# Patient Record
Sex: MALE | Race: WHITE | NOT HISPANIC OR LATINO | URBAN - METROPOLITAN AREA
[De-identification: names, ages, dates, MRNs, and addresses within clinical notes are randomized per-mention and may not be internally consistent; named-entity substitution may affect disease eponyms.]

---

## 2021-02-12 ENCOUNTER — VIRTUAL VISIT (OUTPATIENT)
Dept: FAMILY MEDICINE | Facility: CLINIC | Age: 31
End: 2021-02-12
Payer: COMMERCIAL

## 2021-02-12 DIAGNOSIS — F64.9 GENDER DYSPHORIA: Primary | ICD-10-CM

## 2021-02-12 PROBLEM — A69.20 LYME DISEASE: Status: ACTIVE | Noted: 2021-02-12

## 2021-02-12 PROCEDURE — 99204 OFFICE O/P NEW MOD 45 MIN: CPT | Mod: 95 | Performed by: STUDENT IN AN ORGANIZED HEALTH CARE EDUCATION/TRAINING PROGRAM

## 2021-02-12 RX ORDER — ALPRAZOLAM 0.5 MG
TABLET ORAL
COMMUNITY
Start: 2014-05-01 | End: 2021-02-12

## 2021-02-12 NOTE — PATIENT INSTRUCTIONS
"[email or web address]  [email or web address]/watch?[email or web address]    Informed Consent   Feminizing Medications      This form refers to the use of estrogen and/or androgen antagonists (sometimes called \"anti-androgens\" or \"androgen blockers\") by persons in the male-to-female spectrum who wish to become feminized to reduce gender dysphoria and facilitate a more feminine gender presentation. While there are risks associated with taking feminizing medications, when appropriately prescribed they can greatly improve mental health and quality of life.     Please seek another opinion if you want additional perspective on any aspect of your care.       Feminizing Effects     1. I understand that estrogen, androgen antagonists, or a combination of the two may be  prescribed to reduce male physical features and feminize my body.     2. I understand that the feminizing effects of estrogen and androgen antagonists can take several  months to a year to become noticeable, speed and degree of change is unpredictable.     3.  I understand that if I am taking estrogen I will probably develop breasts, and:        ? Breasts may take several years to develop to their full size.   ? Even if estrogen is stopped, the breast tissue that has developed will remain.   ? As soon as breasts start growing, it is recommended to have an annual breast exam.  ? There may be milky nipple discharge (galactorrhea). If you develop this, it is advised to check with a doctor to determine the cause.   ? It is not known if taking estrogen increases the risk of breast cancer.     4. I understand that the following changes are generally not permanent (that is, they will likely reverse if I stop taking feminizing medications):     ? Skin may become softer.   ? Muscle mass decreases and there may be a decrease in upper body strength.   ? Body hair growth may become less noticeable and grow more slowly,but won't stop.  ? Male " "pattern baldness may slow down, but will probably not stop completely, and hair that has already been lost will likely not grow back.   ? Fat may redistribute to a more feminine pattern (decreased in abdomen, increased on buttocks/hips/thighs - changing from \"apple shape\" to \"pear shape\").       5. I understand that taking feminizing medications will make my testicles produce less testosterone, which can affect my overall sexual function:    ? Fertility may be impaired, and I should consider sperm banking if I desire biological children.  ? Sperm may not mature, leading to reduced fertility. It is still possible to get someone pregnant however and contraception should be used if needed.  ? Testicles may shrink by 25-50%. Testicular examinations are still recommended.  ? The amount of fluid ejaculated may be reduced.   ? There is typically decrease in morning and spontaneous erections.   ? Erections may not be firm enough for penetrative sex.   ? Libido (sex drive) may decrease.     6. I understand that there are some aspects of my body that are not significantly changed by feminizing medications, though there may be other treatments that can be helpful.    ? Garcia/facial hair may grow more slowly and be less noticeable, but will not go away.   ? Voice pitch will not rise and speech patterns will not become more feminine.   ? The laryngeal prominence (\"Khoi's apple\") will not shrink.      Risks of Feminizing Medications     7. I understand that the medical effects and safety of feminizing medications are not fully understood, and that there may be long-term risks that are not yet known.     8. I understand that I am strongly advised not to take more medication than I am prescribed, as this increases health risks. It won't help changes come faster.     9. I understand that feminizing medications can damage the liver. I have been advised that I should be monitored for possible liver damage as long as I am taking " feminizing medications.     10. I understand that feminizing medications will result in changes that will be noticeable by other people, and that some people in similar circumstances have experienced harassment, discrimination, and violence, while others have lost support of loved ones. I have been advised that referrals can be made for support/counselling if this would be helpful.     Medical Risks Associated with Estrogen     11. I understand that taking estrogen increases the risk of blood clots, which can result in:     ? Pulmonary embolism (blood clot to the lungs), which may cause death.  Stroke, which may cause permanent brain damage or death   ? Heart attack   ? Chronic leg vein problems     I understand that the risk of blood clots is much worse if I smoke cigarettes, especially over age 40. I understand that the danger is so high that I should stop smoking completely if I start taking estrogen. I can ask my doctor for advice on quitting.    12. I understand that taking estrogen can increase deposits of fat around my internal organs, which is associated with increased risk for diabetes and heart disease.     13. I understand that taking estrogen can cause increased blood pressure,  estrogen increases the risk of gallstones,  estrogen can cause headaches or migraines and can cause nausea and vomiting. I have been advised that if I develop these, it is recommended that I discuss this with my doctor.     14. I understand that it is not known if taking estrogen increases the risk of non-cancerous tumors of the pituitary gland. I have been informed that although this is typically not life-threatening, it can damage vision. I understand that this will be monitored.    15. I have been informed that I am more likely to have dangerous side effects from estrogen if I smoke, am overweight, am over 40 years old, or have a history of blood clots, high blood pressure, or a family history of breast cancer.     16. I have  been informed that if I take too much estrogen, my body may convert it into testosterone, which may slow or stop feminization.     Medical Risks Associated with Androgen Antagonists     17. I have been informed that spironolactone affects the balance of water and salts in the kidneys, and that this may:     ? Increase the amount of urine produced, and I may urinate more frequently   ? Reduce blood pressure   ? Rarely, cause high levels of potassium in the blood, and this will be monitored    18. I understand that some androgen antagonists make it more difficult to evaluate the results of PSA test. If I am over 50, I should have my prostate evaluated every year.     Prevention of Medical Complications     19. I agree to take feminizing medications as prescribed and to tell my care provider if I am not happy with the treatment or am experiencing any problems.     20. I understand that the right dose or type of medication prescribed for me may not be the same as for someone else.     21. I understand that physical examinations and blood tests are needed on a regular basis (monthly, at first) to check for negative side effects of feminizing medications.     22. I understand that feminization medications can interact with other medication (including other sources of hormones), dietary supplements, herbs, alcohol, and street drugs. I understand that being honest with my care provider about what else I am taking will help prevent medical complications that could be life-threatening. I have been informed that I will continue to get medical care no matter what information I share.     23. I understand that some medical conditions make it dangerous to take estrogen or androgen antagonists. I agree to be checked for risky conditions before the decision to start or continue feminizing medication is made.     24. I understand that I can choose to stop taking feminizing medication at any time, and that it is advised that I do  this with the help of my doctor to make sure there are no negative reactions to stopping. I understand that my doctor may suggest I reduce, switch or stop taking feminizing medication, if there are severe health risks that can't be controlled.    My signature below confirms that:     ? My doctor has talked with me about the benefits and risks of feminizing medication, the possible or likely consequences of hormone therapy, and potential treatment options.   ? I understand the risks that may be involved.   ? I understand that this form covers known effects and risks and that there may be long-term effects or risks that are not yet known.   ? I have had sufficient opportunity to discuss treatment options with my doctor. All of my questions have been answered to my satisfaction.   ? I believe I have adequate knowledge on which to base informed consent to the provision of feminizing medication.     Based on this:     _____ I wish to begin taking estrogen.     _____ I wish to begin taking androgen antagonists (e.g., Spironolactone).     _____ I do not wish to begin taking feminizing medication at this time.     Whatever your current decision is, please talk with your doctor any time you have questions, concerns, or want to re-evaluate your options.         ____________________________________ __________________   Patient Signature     Date         ____________________________________ __________________   Prescribing Clinician Signature    Date

## 2021-02-12 NOTE — PROGRESS NOTES
"       HPI     Start: 8:07 AM  End: 8:40 AM    Patient presents with:  Establish Care: Initial HRT visit     Lives in [place]. No car.    D is a 30 year old individual that uses pronouns she/her and is consulting due to HRT start.    Gender identity  Gender Identity or expression  Female, she/her    Sex Assigned at Birth  male    Sexual orientation   Pansexual.    Gender journey: Has been putting figuring out gender for many years. 7 yo felt gender discordance. For the last 9 years she has told everyone she knows, friends and family. Slightly more comfortable over time, and now it's time to start HRT to help things that are frustrating. In past has had \"breaking point\" having to live double life. Panic attacks, and miserable. This is around college, trying to finish college. Since then, pretty comfortable. Around age 21, presenting as her gender.     Has researched HRT. No specific worries about the medications.     Support network for gender identity: friends and family. Can talk freely to many people. Open to everyone about it including work place.    Anatomic dysphoria  Hair being dark, having to shave all the time.   Fat re-distribution. Thinner hair. Breasts.   Unsure about surgery.    Long time ago has spoken to 2 different doctors about gender - written off, was not helpful, and didn't listen. They tried to treat her depression instead of root of depression which was gender discordance.    Had a mental health provider that was \"given\" to her by doctor, but they weren't very knowledgeable. Otherwise no therapist. Was thinking of starting.    No genital dysphoria. More worried about external things others see.   Voice - kind of bothers her, but has changed it and worked on it over time.    Body characteristics pt is happy with and does not want to change:  Don't know.       Previous medical care related to gender affirmation:   Prior providers None  What hormones have you been on and for how long? N/A  - Any " "problems with prior hormone treatment?   N/A  Previous surgeries related to gender affirmation include: N/A    Procedures or Surgical interventions desired:   Undecided     Mental Health Assessment:  Active symptoms:     Anxiety on and off - used to be bad (panic attack every other day, hands sweating, tunnel vision) - now better. \"Hypochondriac.\" Now figured out what the symptoms are, and recognize for what it is.    Some depressive symptoms - worse prior given periods of stress, grief. Recently been \"fine\" and sometimes don't sleep the best. Overall feel \"alright\"    No thoughts of self harm   Psych dx history None  Psych hospitalizations None  Hx of self harm:  No  Hx of suicidal thoughts: Yes; please explain: younger years per above  Hx of suicide attempts: No    Therapy history for gender support: None  Non gender related therapist? None  Chemical use history None  * Interested in starting - more specific to gender    Medications prescribed for above and by whom? None  External Records received: Yes    Past History   Past Surgical History:   Procedure Laterality Date     HC TOOTH EXTRACTION W/FORCEP       Patient Active Problem List   Diagnosis     Mild intermittent asthma     CARDIOVASCULAR SCREENING; LDL GOAL LESS THAN 160     Lyme disease     Past Medical History:   Diagnosis Date     Inguinal hernia      Mild intermittent asthma        No current outpatient medications on file.     Family History   Problem Relation Age of Onset     C.A.D. Father      Diabetes Father      Hypertension Father    No early MI, stroke. VTE.  Grandfather had multiple heart problems - \"leaky valves.\" No early ASCVD.  Some DM.    No Known Allergies  Social History     Socioeconomic History     Marital status: Single     Spouse name: Not on file     Number of children: Not on file     Years of education: Not on file     Highest education level: Not on file   Occupational History     Occupation: student     Employer: [employer]   Social " Needs     Financial resource strain: Not on file     Food insecurity     Worry: Not on file     Inability: Not on file     Transportation needs     Medical: Not on file     Non-medical: Not on file   Tobacco Use     Smoking status: Never Smoker   Substance and Sexual Activity     Alcohol use: No     Drug use: Not on file     Sexual activity: Never   Lifestyle     Physical activity     Days per week: Not on file     Minutes per session: Not on file     Stress: Not on file   Relationships     Social connections     Talks on phone: Not on file     Gets together: Not on file     Attends Cheondoism service: Not on file     Active member of club or organization: Not on file     Attends meetings of clubs or organizations: Not on file     Relationship status: Not on file     Intimate partner violence     Fear of current or ex partner: Not on file     Emotionally abused: Not on file     Physically abused: Not on file     Forced sexual activity: Not on file   Other Topics Concern     Not on file   Social History Narrative     Not on file       Sexual health and relationships:  Fertility plans:    None  No hx STIs.           Review of Systems:        CONSTITUTIONAL: NEGATIVE for fever, chills, change in weight  INTEGUMENTARY/SKIN: NEGATIVE for worrisome rashes, moles or lesions  EYES: NEGATIVE for vision changes or irritation  ENT/MOUTH: NEGATIVE for ear, mouth and throat problems  RESP: NEGATIVE for significant cough or SOB  BREAST: NEGATIVE for masses, tenderness or discharge  CV: NEGATIVE for chest pain, palpitations or peripheral edema  GI: NEGATIVE for nausea, abdominal pain, heartburn, or change in bowel habits  : NEGATIVE for frequency, dysuria, or hematuria  MUSCULOSKELETAL: some sore muscles - TMJ disorder --> upper body, neck, back  NEURO: NEGATIVE for weakness, dizziness or paresthesias  ENDOCRINE: NEGATIVE for temperature intolerance, skin/hair changes  HEME/ALLERGY: NEGATIVE for bleeding problems  PSYCHIATRIC:  NEGATIVE for changes in mood or affect  Sleep: normal           Physical Exam:   No VS given virtual visit    GENERAL:: healthy, alert and no distress  SKIN: no suspicious lesions, no rashes  Psych: Alert and oriented times 3; coherent speech, normal rate and volume, able to articulate logical thoughts, able to abstract reason, no tangential thoughts, no hallucinations or delusions. Affect is euthymic.         Labs:     Office Visit on 12/27/2007   Component Date Value Ref Range Status     Specimen Description 12/27/2007 Throat   Final     Rapid Strep A Screen 12/27/2007    Final                    Value:NEGATIVE: No Group A streptococcal antigen detected by immunoassay, await                           culture report.     Report status 12/27/2007 FINAL 12/27/2007   Final     Specimen Description 12/27/2007 Throat   Final     Culture Micro 12/27/2007 No Beta Streptococcus isolated   Final     Report status 12/27/2007 FINAL 12/31/2007   Final     Assessment and Plan     Carmelo was seen today for establish care.    Diagnoses and all orders for this visit:    Gender dysphoria  Establishing care today for gender-affirming HRT. Extensive history obtained.  [facility] referral placed for therapy (gender-specific), voice training, hair removal options  Plan:    Next visit to discuss informed consent form. Placed in AVS for patient to review    I will review medical records in more detail    Plan for labs to be done - can be at any [facility] lab close to patient    Will need to discuss in more detail administration method  -     COMPREHENSIVE GENDER CARE REFERRAL - INTERNAL      Educated about 3 parts of evaluation:    1. Medical safety for hormones :   Labs done today, see above   NEIL for records sent, will need to be reviewed by: myself  Medication plan:   feminizing hormone therapy with estrogen     Administration method:  TBD  Next labs and exam:       2. Mental health assessment  To be completed by: myself    3. Informed consent  process.   Consent  Yes Is able to provide informed consent   Yes Likely to take hormones in a responsible manner  No Discussed physical effects, benefits, and risk assessment & modification  No Discussed the clinical and biochemical monitoring of hormonal changes and the potential impact on reproductive health & fertility        Today's visit included assessment of interventions to alleviate symptoms related to gender dysphoria or gender nonconformity, including psychological support, medical treatment, and options for social support or changes in gender expression. Today's visit also assessed this individual's suicide risk, as transgender patients are at higher risk of suicide, gender expression, gender identity and how well consolidated in that identity, presence or absence of gender dysphoria versus gender non-conformity, and assessment and diagnosis of coexisting mental health concerns.This assessment is based on the 2011 published Standards of Care for the Health of Transsexual, Transgender, and Gender-Nonconforming People, Version 7, by the [employer] of Transgender Health.  Mera Montana MD

## 2021-02-12 NOTE — PROGRESS NOTES
Preceptor Attestation:   Patient seen and evaluated via video visit. I discussed the patient with the resident. I have verified the content of the note, which accurately reflects my assessment of the patient and the plan of care.   Supervising Physician:  Anton Machado DO.

## 2021-02-13 ENCOUNTER — HEALTH MAINTENANCE LETTER (OUTPATIENT)
Age: 31
End: 2021-02-13

## 2021-02-16 ENCOUNTER — TELEPHONE (OUTPATIENT)
Dept: OTOLARYNGOLOGY | Facility: CLINIC | Age: 31
End: 2021-02-16

## 2021-02-16 NOTE — PROGRESS NOTES
Kaity is a 30 year old who is being evaluated via a billable video visit.      How would you like to obtain your AVS? [employer]  If the video visit is dropped, the invitation should be resent by: Send to e-mail at: [email or web address]  Will anyone else be joining your video visit? No      Video Start Time: 8:04 AM    Assessment & Plan     Gender dysphoria  Consent form reviewed today. Verbal consent given, form signed by me.   She will obtain baseline lab work. After this, I will review labs with patient via [employer] and send rx.  Plan for PO estradiol 2 mg daily, spironolactone 50 mg daily.   F/u in 2-4 weeks after HRT start. Will also follow up on referrals at this visit (comprehensive gender care).  - CBC with platelets  - Comprehensive metabolic panel  - Lipid panel  - Testosterone total          Return in about 1 month (around 3/17/2021).    Mera Montana MD  [facility]    Russ Briceno is a 30 year old who presents for the following health issues     HPI   Patient presents with:  RECHECK: HRT follow up. no concerns or qusetions per patient.    DEANDRE-7 SCORE 2/17/2021   Total Score 8 (mild anxiety)   Total Score 8     PHQ 2/17/2021   PHQ-9 Total Score 4   Q9: Thoughts of better off dead/self-harm past 2 weeks Not at all       Reviewed consent form today  Had questions about SL estradiol      Review of Systems   Constitutional, HEENT, cardiovascular, pulmonary, gi and gu systems are negative, except as otherwise noted.      Objective           Vitals:  No vitals were obtained today due to virtual visit.    Physical Exam   GENERAL: Healthy, alert and no distress  EYES: Eyes grossly normal to inspection.  No discharge or erythema, or obvious scleral/conjunctival abnormalities.  RESP: No audible wheeze, cough, or visible cyanosis.  No visible retractions or increased work of breathing.    SKIN: Visible skin clear. No significant rash, abnormal pigmentation or lesions.  NEURO: Cranial nerves  grossly intact.  Mentation and speech appropriate for age.  PSYCH: Mentation appears normal, affect normal/bright, judgement and insight intact, normal speech and appearance well-groomed.        ----- Service Performed and Documented by Resident or Fellow ------        Video-Visit Details    Type of service:  Video Visit    Video End Time:8:29 AM    Originating Location (pt. Location): Home    Distant [facility]     Platform used for Video Visit: [employer]    Answers for HPI/ROS submitted by the patient on 2/17/2021   If you checked off any problems, how difficult have these problems made it for you to do your work, take care of things at home, or get along with other people?: Somewhat difficult  PHQ9 TOTAL SCORE: 4  DEANDRE 7 TOTAL SCORE: 8

## 2021-02-16 NOTE — TELEPHONE ENCOUNTER
LVM offering next available New Video/Telephone visit with Mary Trimble or Dakotah Smith per Comprehensive Gender Care Referral for Vocal Training.     Pt can also be scheduled for two Return Video/Telephone visits after New visit (all appts should be at least 14 days apart).     Please attach referral to appt.     Left call center number for scheduling.    Writer also explained that [place] so it is recommended that pt check with their insurance provider on coverage. Also provided [facility] contact info ([phone]).

## 2021-02-17 ENCOUNTER — VIRTUAL VISIT (OUTPATIENT)
Dept: FAMILY MEDICINE | Facility: CLINIC | Age: 31
End: 2021-02-17
Payer: COMMERCIAL

## 2021-02-17 DIAGNOSIS — F64.9 GENDER DYSPHORIA: Primary | ICD-10-CM

## 2021-02-17 PROCEDURE — 99214 OFFICE O/P EST MOD 30 MIN: CPT | Mod: 95 | Performed by: STUDENT IN AN ORGANIZED HEALTH CARE EDUCATION/TRAINING PROGRAM

## 2021-02-17 ASSESSMENT — ANXIETY QUESTIONNAIRES
7. FEELING AFRAID AS IF SOMETHING AWFUL MIGHT HAPPEN: SEVERAL DAYS
3. WORRYING TOO MUCH ABOUT DIFFERENT THINGS: SEVERAL DAYS
GAD7 TOTAL SCORE: 8
2. NOT BEING ABLE TO STOP OR CONTROL WORRYING: SEVERAL DAYS
1. FEELING NERVOUS, ANXIOUS, OR ON EDGE: MORE THAN HALF THE DAYS
GAD7 TOTAL SCORE: 8
5. BEING SO RESTLESS THAT IT IS HARD TO SIT STILL: SEVERAL DAYS
7. FEELING AFRAID AS IF SOMETHING AWFUL MIGHT HAPPEN: SEVERAL DAYS
4. TROUBLE RELAXING: SEVERAL DAYS
GAD7 TOTAL SCORE: 8
6. BECOMING EASILY ANNOYED OR IRRITABLE: SEVERAL DAYS

## 2021-02-17 ASSESSMENT — PATIENT HEALTH QUESTIONNAIRE - PHQ9
10. IF YOU CHECKED OFF ANY PROBLEMS, HOW DIFFICULT HAVE THESE PROBLEMS MADE IT FOR YOU TO DO YOUR WORK, TAKE CARE OF THINGS AT HOME, OR GET ALONG WITH OTHER PEOPLE: SOMEWHAT DIFFICULT
SUM OF ALL RESPONSES TO PHQ QUESTIONS 1-9: 4
SUM OF ALL RESPONSES TO PHQ QUESTIONS 1-9: 4

## 2021-02-17 NOTE — PATIENT INSTRUCTIONS
1) Get labs done at any [place] lab  2) After this, I will MyChart you to discuss the results, and send in your prescription  3) We should plan to follow up in 2-4 weeks after you start the medications        Effects and Expected Time Course of Feminizing Hormones    Effect Expected Onset Expected Maximum Effect   Decreased Libido 1-3 months 1-2 years   Decreased Spontaneous Erections 1-3 months 3-6 months   Male pattern baldness No regrowth, loss stops 1-3 months 1-2 years   Softening of skin/decreased oiliness 3-6 months Unknown   Decreased Muscle mass 3-6 months 1-2 years   Breast Growth 3-6 months 2-3 years   Decreased Testicular volume 3-6 months 2-3 years   Body Fat redistribution 3-6 months 2-5 years   Thinning and slowed growth of body and facial hair  + 6-12 months >3 years   Male Sexual Dysfunction Variable Variable   Decreased sperm production Variable Variable   +complete removal of male facial hair and body hair requires electrolysis, laser treatment or both

## 2021-02-17 NOTE — PROGRESS NOTES
Preceptor Attestation:   Patient seen via video, evaluated and discussed with the resident. I have verified the content of the note, which accurately reflects my assessment of the patient and the plan of care.   Supervising Physician:  William Darby MD   Answers for HPI/ROS submitted by the patient on 2/17/2021   If you checked off any problems, how difficult have these problems made it for you to do your work, take care of things at home, or get along with other people?: Somewhat difficult  PHQ9 TOTAL SCORE: 4  DEANDRE 7 TOTAL SCORE: 8

## 2021-02-18 ASSESSMENT — PATIENT HEALTH QUESTIONNAIRE - PHQ9: SUM OF ALL RESPONSES TO PHQ QUESTIONS 1-9: 4

## 2021-02-18 ASSESSMENT — ANXIETY QUESTIONNAIRES: GAD7 TOTAL SCORE: 8

## 2021-03-05 DIAGNOSIS — F64.9 GENDER DYSPHORIA: ICD-10-CM

## 2021-03-05 LAB
ALBUMIN SERPL-MCNC: 4.1 G/DL (ref 3.4–5)
ALP SERPL-CCNC: 46 U/L (ref 40–150)
ALT SERPL W P-5'-P-CCNC: 27 U/L (ref 0–70)
ANION GAP SERPL CALCULATED.3IONS-SCNC: 6 MMOL/L (ref 3–14)
AST SERPL W P-5'-P-CCNC: 20 U/L (ref 0–45)
BILIRUB SERPL-MCNC: 0.2 MG/DL (ref 0.2–1.3)
BUN SERPL-MCNC: 9 MG/DL (ref 7–30)
CALCIUM SERPL-MCNC: 9.6 MG/DL (ref 8.5–10.1)
CHLORIDE SERPL-SCNC: 104 MMOL/L (ref 94–109)
CHOLEST SERPL-MCNC: 164 MG/DL
CO2 SERPL-SCNC: 29 MMOL/L (ref 20–32)
CREAT SERPL-MCNC: 0.84 MG/DL (ref 0.66–1.25)
ERYTHROCYTE [DISTWIDTH] IN BLOOD BY AUTOMATED COUNT: 12.2 % (ref 10–15)
GFR SERPL CREATININE-BSD FRML MDRD: >90 ML/MIN/{1.73_M2}
GLUCOSE SERPL-MCNC: 109 MG/DL (ref 70–99)
HCT VFR BLD AUTO: 49.9 % (ref 40–53)
HDLC SERPL-MCNC: 30 MG/DL
HGB BLD-MCNC: 15.8 G/DL (ref 13.3–17.7)
LDLC SERPL CALC-MCNC: 94 MG/DL
MCH RBC QN AUTO: 29.6 PG (ref 26.5–33)
MCHC RBC AUTO-ENTMCNC: 31.7 G/DL (ref 31.5–36.5)
MCV RBC AUTO: 94 FL (ref 78–100)
NONHDLC SERPL-MCNC: 134 MG/DL
PLATELET # BLD AUTO: 170 10E9/L (ref 150–450)
POTASSIUM SERPL-SCNC: 4.3 MMOL/L (ref 3.4–5.3)
PROT SERPL-MCNC: 8 G/DL (ref 6.8–8.8)
RBC # BLD AUTO: 5.33 10E12/L (ref 4.4–5.9)
SODIUM SERPL-SCNC: 139 MMOL/L (ref 133–144)
TRIGL SERPL-MCNC: 202 MG/DL
WBC # BLD AUTO: 8.2 10E9/L (ref 4–11)

## 2021-03-05 PROCEDURE — 80061 LIPID PANEL: CPT | Performed by: STUDENT IN AN ORGANIZED HEALTH CARE EDUCATION/TRAINING PROGRAM

## 2021-03-05 PROCEDURE — 85027 COMPLETE CBC AUTOMATED: CPT | Performed by: STUDENT IN AN ORGANIZED HEALTH CARE EDUCATION/TRAINING PROGRAM

## 2021-03-05 PROCEDURE — 80053 COMPREHEN METABOLIC PANEL: CPT | Performed by: STUDENT IN AN ORGANIZED HEALTH CARE EDUCATION/TRAINING PROGRAM

## 2021-03-05 PROCEDURE — 84403 ASSAY OF TOTAL TESTOSTERONE: CPT | Performed by: STUDENT IN AN ORGANIZED HEALTH CARE EDUCATION/TRAINING PROGRAM

## 2021-03-05 PROCEDURE — 36415 COLL VENOUS BLD VENIPUNCTURE: CPT | Performed by: STUDENT IN AN ORGANIZED HEALTH CARE EDUCATION/TRAINING PROGRAM

## 2021-03-08 DIAGNOSIS — F64.9 GENDER DYSPHORIA: Primary | ICD-10-CM

## 2021-03-09 LAB — TESTOST SERPL-MCNC: 339 NG/DL (ref 240–950)

## 2021-03-10 RX ORDER — SPIRONOLACTONE 50 MG/1
50 TABLET, FILM COATED ORAL DAILY
Qty: 30 TABLET | Refills: 0 | Status: SHIPPED | OUTPATIENT
Start: 2021-03-10 | End: 2021-04-22

## 2021-03-10 RX ORDER — ESTRADIOL 2 MG/1
2 TABLET ORAL DAILY
Qty: 30 TABLET | Refills: 0 | Status: SHIPPED | OUTPATIENT
Start: 2021-03-10 | End: 2021-04-22

## 2021-04-21 NOTE — PROGRESS NOTES
"    Assessment & Plan     Gender dysphoria  3/24 HRT start. Early check-in, no concerns. Refilled. Next f/u in 2 months - will obtain 3 month labs at this time.  - spironolactone (ALDACTONE) 50 MG tablet  Dispense: 90 tablet; Refill: 1  - estradiol (ESTRACE) 2 MG tablet  Dispense: 90 tablet; Refill: 1    Grief  Housing issues  Recent death of grandmother who patient was caring for in hospice. Multiple stressors including now having to find a new place to live. Offered SW assistance today, which she deferred. Encouraged her to reach out if support needed      Return in about 2 months (around 6/22/2021).    Mera Montana MD  [facility] ARABELLA Briceno is a 30 year old who presents for the following health issues     HPI     Patient presents with:  RECHECK: HRT f/u. no other specific questions or concerns. pateint wants to check up on medications. no side effects on medicatons.   Refill Request: spironolactone and estradiol   HRT started exactly 30 days ago - out today.  Going well. No headache, chest pain, calf pain, dyspnea, abdominal pain. No noticeable urinary frequency. No light-headedness.    Grandma who she lived with and was taking care of in hospice passed away 2 weeks ago. Lots of stressors in life right now. Family conflict. She also has to find a new place to live now without much support.      Review of Systems   Constitutional, HEENT, cardiovascular, pulmonary, gi and gu systems are negative, except as otherwise noted.      Objective    /81   Pulse 76   Temp 98.6  F (37  C) (Oral)   Resp 16   Ht 1.778 m (5' 10\")   Wt 77.1 kg (170 lb)   SpO2 100%   BMI 24.39 kg/m    Body mass index is 24.39 kg/m .  Physical Exam  Constitutional:       General: She is not in acute distress.     Appearance: She is well-developed. She is not diaphoretic.   HENT:      Head: Normocephalic and atraumatic.   Eyes:      Conjunctiva/sclera: Conjunctivae normal.   Neck:      Musculoskeletal: " Normal range of motion.   Cardiovascular:      Rate and Rhythm: Normal rate.   Pulmonary:      Effort: Pulmonary effort is normal. No respiratory distress.   Musculoskeletal: Normal range of motion.      Comments: No calf tenderness or swelling   Neurological:      General: No focal deficit present.      Mental Status: She is alert.            ----- Service Performed and Documented by Resident or Fellow ------

## 2021-04-22 ENCOUNTER — OFFICE VISIT (OUTPATIENT)
Dept: FAMILY MEDICINE | Facility: CLINIC | Age: 31
End: 2021-04-22
Payer: COMMERCIAL

## 2021-04-22 VITALS
TEMPERATURE: 98.6 F | RESPIRATION RATE: 16 BRPM | HEIGHT: 70 IN | HEART RATE: 76 BPM | OXYGEN SATURATION: 100 % | BODY MASS INDEX: 24.34 KG/M2 | WEIGHT: 170 LBS | DIASTOLIC BLOOD PRESSURE: 81 MMHG | SYSTOLIC BLOOD PRESSURE: 110 MMHG

## 2021-04-22 DIAGNOSIS — Z59.9 HOUSING PROBLEMS: ICD-10-CM

## 2021-04-22 DIAGNOSIS — F64.9 GENDER DYSPHORIA: Primary | ICD-10-CM

## 2021-04-22 DIAGNOSIS — F43.21 GRIEF: ICD-10-CM

## 2021-04-22 PROCEDURE — 99214 OFFICE O/P EST MOD 30 MIN: CPT | Mod: GC | Performed by: STUDENT IN AN ORGANIZED HEALTH CARE EDUCATION/TRAINING PROGRAM

## 2021-04-22 RX ORDER — SPIRONOLACTONE 50 MG/1
50 TABLET, FILM COATED ORAL DAILY
Qty: 90 TABLET | Refills: 1 | Status: SHIPPED | OUTPATIENT
Start: 2021-04-22 | End: 2021-07-15

## 2021-04-22 RX ORDER — ESTRADIOL 2 MG/1
2 TABLET ORAL DAILY
Qty: 90 TABLET | Refills: 1 | Status: SHIPPED | OUTPATIENT
Start: 2021-04-22 | End: 2021-10-25

## 2021-04-22 SDOH — ECONOMIC STABILITY - INCOME SECURITY: PROBLEM RELATED TO HOUSING AND ECONOMIC CIRCUMSTANCES, UNSPECIFIED: Z59.9

## 2021-04-22 ASSESSMENT — MIFFLIN-ST. JEOR: SCORE: 1732.36

## 2021-04-22 NOTE — PROGRESS NOTES
Preceptor Attestation:    I discussed the patient with the resident and evaluated the patient in person. I have verified the content of the note, which accurately reflects my assessment of the patient and the plan of care.   Supervising Physician:  Yovani Joe MD.

## 2021-07-13 ENCOUNTER — TELEPHONE (OUTPATIENT)
Dept: FAMILY MEDICINE | Facility: CLINIC | Age: 31
End: 2021-07-13

## 2021-07-13 DIAGNOSIS — F64.9 GENDER DYSPHORIA: ICD-10-CM

## 2021-07-13 NOTE — TELEPHONE ENCOUNTER
[facility] phone call message- patient requesting a refill:    Full Medication Name: spironolactone (ALDACTONE) 50 MG tablet, estradiol (ESTRACE) 2 MG tablet    Dose: See chart    Pharmacy confirmed as   [facility] - [place], [place] - [address], [place] [address]  Phone: [phone]  : Yes    Additional Comments: Almost out     OK to leave a message on voice mail? Yes    Primary language: English      needed? No    Call taken on July 13, 2021 at 10:28 AM by Derian Kaufman

## 2021-07-15 RX ORDER — SPIRONOLACTONE 50 MG/1
50 TABLET, FILM COATED ORAL DAILY
Qty: 90 TABLET | Refills: 1 | Status: SHIPPED | OUTPATIENT
Start: 2021-07-15 | End: 2021-12-07

## 2021-07-19 NOTE — TELEPHONE ENCOUNTER
RN reached out to pharmacy who confirmed that aletha is ready for pick-up and estradiol is ready for  at different pharmacy ([facility] [address]).     RN attempted to reach patient, invalid number on file. Will reach out via [employer].     Brenda Fulton RN

## 2021-09-14 ENCOUNTER — TELEPHONE (OUTPATIENT)
Dept: DERMATOLOGY | Facility: CLINIC | Age: 31
End: 2021-09-14

## 2021-09-14 NOTE — TELEPHONE ENCOUNTER
Unable to leave message (phone number on file is not taking calls at this time) to call [facility].    Patient was on our wait list to see Dr. Kilpatrick for a laser hair removal consult.  Please offer patient a telephone visit with Dr. Kilpatrick on any MICHELLE slot on Tuesdays.  Patients are required to submit a photo via [employer] of their hair color in the upper pubic region.  They should not send a photo of the genitals, but just the top of the mons pubis so Dr. Kilpatrick can determine natural hair color for laser settings.    Melissa Forrester  Surgical Specialties Procedure   [facility]  9/14/2021 9:33 AM

## 2021-09-19 ENCOUNTER — HEALTH MAINTENANCE LETTER (OUTPATIENT)
Age: 31
End: 2021-09-19

## 2021-09-21 NOTE — TELEPHONE ENCOUNTER
2nd attempt to schedule as noted below.  Soum message sent to patient.    Melissa Forrester  Surgical Specialties Procedure   [facility]  9/21/2021 9:01 AM

## 2021-10-22 DIAGNOSIS — F64.9 GENDER DYSPHORIA: ICD-10-CM

## 2021-10-22 NOTE — TELEPHONE ENCOUNTER
Pt previously saw Dr. Montana who has now graduated for hormone care, new assigned provider would be Dr. Treviño.,    Routing to review refill.    RN attempted to call pt back to schedule appt as they are due per last office note but not accepting calls. If pt calls back please schedule appt in clinic    Chantale Thomas RN

## 2021-10-22 NOTE — TELEPHONE ENCOUNTER
Verify that the refill encounter hasn't been started Yes    [facility] phone call message- patient requesting a refill:    Full Medication Name:   estradiol (ESTRACE) 2 MG tablet 90 tablet 1         Dose: Sig - Route: Take 1 tablet (2 mg) by mouth daily      Pharmacy confirmed as   [id number] - [place], [place] - [address] [facility] [address]  [address]  [facility] [address]  Phone: [phone] Fax: [phone]  : Yes    Medication tab checked to see if medication has been sent  Yes    Additional Comments: Patient completely out, was unaware they needed to request refills.     OK to leave a message on voice mail? Yes    Advised patient refill may take up to 2 business days? Yes    Primary language: English      needed? No    Call taken on October 22, 2021 at 2:51 PM by Tanika Fisher to P [facility] MED REFILL

## 2021-10-25 RX ORDER — ESTRADIOL 2 MG/1
2 TABLET ORAL DAILY
Qty: 30 TABLET | Refills: 0 | Status: SHIPPED | OUTPATIENT
Start: 2021-10-25 | End: 2021-12-07

## 2021-10-26 NOTE — TELEPHONE ENCOUNTER
Estradiol 1 month supply sent. Patient new to me, last seen by prior PCP 4/22/2021, doing well. Recommended follow up in 2 months for 3 month labs at that time. Is on low dose. Absolutely no refills until patient has a follow up appointment with labs.     Yvrose Treviño MD

## 2021-12-06 DIAGNOSIS — F64.9 GENDER DYSPHORIA: ICD-10-CM

## 2021-12-06 NOTE — TELEPHONE ENCOUNTER
Follow up appt scheduled 12/17. RN unable to reorder as previous note states no refills until follow-up appointment with lab. Routing to Dr. Treviño to refill if appropriate.     Brenda Fulton RN

## 2021-12-06 NOTE — TELEPHONE ENCOUNTER
[facility] phone call message- patient requesting a refill:    Full Medication Name: Spironolactone, estradiol        Pharmacy confirmed as :  [id number] - [place], [place] - [address]  [address] [address]  Phone: [phone] Fax: [phone] Yes    Additional Comments: Patient is out of medication     OK to leave a message on voice mail? Yes    Primary language: English      needed? No    Call taken on December 6, 2021 at 3:50 PM by Dyana Chen CMA

## 2021-12-07 RX ORDER — SPIRONOLACTONE 50 MG/1
50 TABLET, FILM COATED ORAL DAILY
Qty: 30 TABLET | Refills: 0 | Status: SHIPPED | OUTPATIENT
Start: 2021-12-07 | End: 2022-05-05

## 2021-12-07 RX ORDER — ESTRADIOL 2 MG/1
2 TABLET ORAL DAILY
Qty: 30 TABLET | Refills: 0 | Status: SHIPPED | OUTPATIENT
Start: 2021-12-07 | End: 2021-12-17

## 2021-12-07 NOTE — TELEPHONE ENCOUNTER
Patient new to me, previously followed by Dr. Montana. Started gender affirming hormone therapy with estradiol and spironolactone in March 2021, has not had labs since that time. Scheduled to see me 12/17. Short refill ordered so labs can be done at upcoming visit. No further refills until visit with labs.    Yvrose Treviño MD

## 2021-12-17 ENCOUNTER — OFFICE VISIT (OUTPATIENT)
Dept: FAMILY MEDICINE | Facility: CLINIC | Age: 31
End: 2021-12-17
Payer: COMMERCIAL

## 2021-12-17 VITALS
SYSTOLIC BLOOD PRESSURE: 134 MMHG | RESPIRATION RATE: 16 BRPM | BODY MASS INDEX: 24.34 KG/M2 | OXYGEN SATURATION: 96 % | TEMPERATURE: 98.5 F | HEART RATE: 97 BPM | HEIGHT: 70 IN | WEIGHT: 170 LBS | DIASTOLIC BLOOD PRESSURE: 84 MMHG

## 2021-12-17 DIAGNOSIS — F64.9 GENDER DYSPHORIA: ICD-10-CM

## 2021-12-17 LAB
ALBUMIN SERPL-MCNC: 4.4 G/DL (ref 3.4–5)
ALP SERPL-CCNC: 40 U/L (ref 40–150)
ALT SERPL W P-5'-P-CCNC: 44 U/L (ref 0–70)
ANION GAP SERPL CALCULATED.3IONS-SCNC: 7 MMOL/L (ref 3–14)
AST SERPL W P-5'-P-CCNC: 25 U/L (ref 0–45)
BILIRUB SERPL-MCNC: 0.5 MG/DL (ref 0.2–1.3)
BUN SERPL-MCNC: 11 MG/DL (ref 7–30)
CALCIUM SERPL-MCNC: 10 MG/DL (ref 8.5–10.1)
CHLORIDE BLD-SCNC: 104 MMOL/L (ref 94–109)
CO2 SERPL-SCNC: 28 MMOL/L (ref 20–32)
CREAT SERPL-MCNC: 0.92 MG/DL (ref 0.52–1.25)
GFR SERPL CREATININE-BSD FRML MDRD: >90 ML/MIN/1.73M2
GLUCOSE BLD-MCNC: 104 MG/DL (ref 70–99)
HBA1C MFR BLD: 5 % (ref 0–5.6)
POTASSIUM BLD-SCNC: 4.2 MMOL/L (ref 3.4–5.3)
PROT SERPL-MCNC: 8.5 G/DL (ref 6.8–8.8)
SODIUM SERPL-SCNC: 139 MMOL/L (ref 133–144)

## 2021-12-17 PROCEDURE — 83036 HEMOGLOBIN GLYCOSYLATED A1C: CPT | Performed by: FAMILY MEDICINE

## 2021-12-17 PROCEDURE — 99214 OFFICE O/P EST MOD 30 MIN: CPT | Performed by: FAMILY MEDICINE

## 2021-12-17 PROCEDURE — 36415 COLL VENOUS BLD VENIPUNCTURE: CPT | Performed by: FAMILY MEDICINE

## 2021-12-17 PROCEDURE — 84403 ASSAY OF TOTAL TESTOSTERONE: CPT | Mod: KX | Performed by: FAMILY MEDICINE

## 2021-12-17 PROCEDURE — 80061 LIPID PANEL: CPT | Performed by: FAMILY MEDICINE

## 2021-12-17 PROCEDURE — 80053 COMPREHEN METABOLIC PANEL: CPT | Performed by: FAMILY MEDICINE

## 2021-12-17 RX ORDER — ALPRAZOLAM 0.5 MG
0.5 TABLET ORAL DAILY PRN
COMMUNITY

## 2021-12-17 RX ORDER — ESTRADIOL 2 MG/1
2 TABLET ORAL DAILY
Qty: 30 TABLET | Refills: 0 | Status: SHIPPED | OUTPATIENT
Start: 2021-12-17 | End: 2022-02-01

## 2021-12-17 ASSESSMENT — MIFFLIN-ST. JEOR: SCORE: 1727.36

## 2021-12-17 NOTE — PROGRESS NOTES
Assessment & Plan     Gender dysphoria  Kaity (she/her) is a 31 year old presenting for delayed 3 month HRT follow-up. Started HRT in March 2021, has been taking 2mg estradiol daily and 50mg spironolactone each once daily. No SOB, chest pain, or leg swelling. She has been seeing positive changes with this dose and would like to continue on 2mg. Refilled estradiol today; pt has aletha at home. Checking labs today, will send results via [employer]. Plan to follow up in 6 months for labs.  - estradiol (ESTRACE) 2 MG tablet  Dispense: 30 tablet; Refill: 0  - Lipid panel  - Testosterone total  - Hemoglobin A1c  - Comprehensive metabolic panel    Anxiety  Discussed mood and how she's been feeling. Anxiety has significantly improved since starting HRT. Has panic attacks every 1-2 months which she manages with 0.5mg Xanax and this works well for her. She has been noticing some mood lability in the past 1-2 months but overall mood has been improving.       Follow up in six months for refill and labs.    No follow-ups on file.     Adalberto Eastman, MS3    Preceptor Attestation:   I was present with the medical student who participated in the service and in the documentation of this note. I have verified the history and personally performed the physical exam and medical decision making. I have verified the content of the note, which accurately reflects my assessment of the patient and the plan of care.      Katiana Gates DO  [facility]    Subjective   Kaity is a 31 year old who presents for the following health issues:    HPI          HPI   Kaity is a 31 year old individual that uses pronouns She/Her/Hers/Herself that presents today for follow up of:  feminizing hormone therapy.     Gender identity: female    Any special concerns today? refill    Has not had estradiol for 3 days because prescription ran out, still taking spironolactone - new pharmacy is [facility]    On hormones?  YES  "+++ Shot day of the week? Not applicable-taking pills/patch/gel   Due for labs?  Yes  Refills of meds needed?  Yes    Past Surgical History:   Procedure Laterality Date     HC TOOTH EXTRACTION W/FORCEP       Patient Active Problem List   Diagnosis     Mild intermittent asthma     CARDIOVASCULAR SCREENING; LDL GOAL LESS THAN 160     Lyme disease     Gender dysphoria     Current Outpatient Medications   Medication Sig Dispense Refill     ALPRAZolam (XANAX) 0.5 MG tablet Take 0.5 mg by mouth daily as needed       estradiol (ESTRACE) 2 MG tablet Take 1 tablet (2 mg) by mouth daily 30 tablet 0     spironolactone (ALDACTONE) 50 MG tablet Take 1 tablet (50 mg) by mouth daily 30 tablet 0     History   Smoking Status     Never Smoker   Smokeless Tobacco     Never Used          Review of Systems:        General    Fat redistribution: yes, stretch marks on legs    Weight change: YES - some weight gain HEENT    Voice change: no     Cardiovascular (CV)    Chest Pains: no    Shortness of breath: related to asthma but not otherwise Chest    Breast changes/development: YES     Gastrointestinal (GI)    Abdominal pain: no    Change in appetite: YES - more hungry Skin    Acne or oily skin: no    Change in hair: no     Genitourinary ()    Change in libido: YES - lower    New sexual partners: YES - female Musculoskeletal    Leg pain or swelling: no     Psychiatric (Psych)    Depression: YES sometimes but anxiety is more pronounced, has also improved with HRT    Anxiety/Panic: yes but has decreased since starting HRT    Mood:  \"good\" and \"fine\"    Labile mood: last month or so              Objective    /84   Pulse 97   Temp 98.5  F (36.9  C) (Oral)   Resp 16   Ht 1.778 m (5' 10\")   Wt 77.1 kg (170 lb)   SpO2 96%   BMI 24.39 kg/m    Body mass index is 24.39 kg/m .  Physical Exam  Vitals reviewed.   Constitutional:       Appearance: Normal appearance.   HENT:      Head: Normocephalic and atraumatic.   Eyes:      General: No " scleral icterus.     Extraocular Movements: Extraocular movements intact.      Conjunctiva/sclera: Conjunctivae normal.   Cardiovascular:      Rate and Rhythm: Normal rate and regular rhythm.      Heart sounds: Normal heart sounds. No murmur heard.  No friction rub. No gallop.    Pulmonary:      Effort: Pulmonary effort is normal. No respiratory distress.      Breath sounds: Normal breath sounds. No stridor. No wheezing or rhonchi.   Musculoskeletal:         General: No swelling.   Neurological:      General: No focal deficit present.      Mental Status: She is alert. Mental status is at baseline.   Psychiatric:         Attention and Perception: Attention normal.         Mood and Affect: Affect normal. Mood is anxious.         Thought Content: Thought content normal.      No results found for this or any previous visit (from the past 24 hour(s)).

## 2021-12-18 LAB
CHOLEST SERPL-MCNC: 175 MG/DL
FASTING STATUS PATIENT QL REPORTED: ABNORMAL
HDLC SERPL-MCNC: 31 MG/DL
LDLC SERPL CALC-MCNC: 117 MG/DL
NONHDLC SERPL-MCNC: 144 MG/DL
TRIGL SERPL-MCNC: 137 MG/DL

## 2021-12-18 ASSESSMENT — ASTHMA QUESTIONNAIRES: ACT_TOTALSCORE: 23

## 2021-12-21 LAB — TESTOST SERPL-MCNC: 345 NG/DL (ref 8–950)

## 2022-02-01 DIAGNOSIS — F64.9 GENDER DYSPHORIA: ICD-10-CM

## 2022-02-01 RX ORDER — ESTRADIOL 2 MG/1
2 TABLET ORAL DAILY
Qty: 30 TABLET | Refills: 3 | Status: SHIPPED | OUTPATIENT
Start: 2022-02-01 | End: 2022-10-19

## 2022-02-01 NOTE — TELEPHONE ENCOUNTER
[facility] phone call message- patient requesting a refill:    Full Medication Name: estradiol (ESTRACE) 2 MG tablet; spironolactone (ALDACTONE) 50 MG tablet      Pharmacy confirmed as     [id number] - [facility] [address]  [address] [facility] [address]      Phone: [phone] Fax: [phone]  : Yes    Additional Comments: The patient is completely out of these medication.     OK to leave a message on voice mail? Yes    Primary language: English      needed? No    Call taken on February 1, 2022 at 9:39 AM by Shelley Power

## 2022-03-05 ENCOUNTER — HEALTH MAINTENANCE LETTER (OUTPATIENT)
Age: 32
End: 2022-03-05

## 2022-05-04 DIAGNOSIS — F64.9 GENDER DYSPHORIA: ICD-10-CM

## 2022-05-04 NOTE — TELEPHONE ENCOUNTER
"Request for medication refill:  spironolactone (ALDACTONE) 50 MG tablet  Providers if patient needs an appointment and you are willing to give a one month supply please refill for one month and  send a letter/MyChart using \".SMILLIMITEDREFILL\" .smillimited and route chart to \"P [facility] \" (Giving one month refill in non controlled medications is strongly recommended before denial)    If refill has been denied, meaning absolutely no refills without visit, please complete the smart phrase \".smirxrefuse\" and route it to the \"P [facility] MED REFILLS\"  pool to inform the patient and the pharmacy.    Neelam Mack        "

## 2022-05-05 RX ORDER — SPIRONOLACTONE 50 MG/1
50 TABLET, FILM COATED ORAL DAILY
Qty: 30 TABLET | Refills: 0 | Status: SHIPPED | OUTPATIENT
Start: 2022-05-05 | End: 2022-06-28

## 2022-06-20 DIAGNOSIS — F64.9 GENDER DYSPHORIA: ICD-10-CM

## 2022-06-28 RX ORDER — SPIRONOLACTONE 50 MG/1
50 TABLET, FILM COATED ORAL DAILY
Qty: 30 TABLET | Refills: 0 | Status: SHIPPED | OUTPATIENT
Start: 2022-06-28 | End: 2022-10-19

## 2022-10-18 DIAGNOSIS — F64.9 GENDER DYSPHORIA: ICD-10-CM

## 2022-10-18 NOTE — TELEPHONE ENCOUNTER
[facility] phone call message- patient requesting a refill:    Full Medication Name: spironolactone (ALDACTONE) 50 MG tablet    estradiol (ESTRACE) 2 MG tablet        Pharmacy confirmed as     [facility] - [place], [place] - [address]  [address]  [address]  [facility] [address]  Phone: [phone] Fax: [phone]    : Yes    Additional Comments: The patient is requesting a refill.     OK to leave a message on voice mail? Yes    Primary language: English      needed? No    Call taken on October 18, 2022 at 1:57 PM by Shelley Power

## 2022-10-18 NOTE — TELEPHONE ENCOUNTER
"Request for medication refill:  Spironalctone  Estradiol    Providers if patient needs an appointment and you are willing to give a one month supply please refill for one month and  send a letter/MyChart using \".SMILLIMITEDREFILL\" .smillimited and route chart to \"P [facility] \" (Giving one month refill in non controlled medications is strongly recommended before denial)    If refill has been denied, meaning absolutely no refills without visit, please complete the smart phrase \".smirxrefuse\" and route it to the \"P [facility] MED REFILLS\"  pool to inform the patient and the pharmacy.    Ratna Li RN        "

## 2022-10-19 RX ORDER — ESTRADIOL 2 MG/1
2 TABLET ORAL DAILY
Qty: 30 TABLET | Refills: 3 | Status: SHIPPED | OUTPATIENT
Start: 2022-10-19

## 2022-10-19 RX ORDER — SPIRONOLACTONE 50 MG/1
50 TABLET, FILM COATED ORAL DAILY
Qty: 30 TABLET | Refills: 0 | Status: SHIPPED | OUTPATIENT
Start: 2022-10-19

## 2022-11-20 ENCOUNTER — HEALTH MAINTENANCE LETTER (OUTPATIENT)
Age: 32
End: 2022-11-20

## 2023-03-23 DIAGNOSIS — F64.9 GENDER DYSPHORIA: ICD-10-CM

## 2023-03-23 NOTE — TELEPHONE ENCOUNTER
[facility] phone call message- patient requesting a refill:    Full Medication Name: estradiol (ESTRACE) 2 MG tablet    spironolactone (ALDACTONE) 50 MG tablet    Pharmacy confirmed as     [facility] - [place], [place] - [address]  [address]  [address] [address]  [facility] [address]  Phone: [phone] Fax: [phone]  : Yes    Additional Comments: PT is requesting a refill and would like to see if a 90 days supply could be given. Pt would like not to have to go to the pharmacy every month.    OK to leave a message on voice mail? Yes    Primary language: English      needed? No    Call taken on March 23, 2023 at 4:11 PM by Chelsea Walsh

## 2023-03-23 NOTE — TELEPHONE ENCOUNTER
"Patient has not been seen since 12/17/2021, no appointments are set up for future  Last labs 12/17/2021    Request for medication refill:  spironolactone (ALDACTONE) 50 MG tablet  estradiol (ESTRACE) 2 MG tablet  Providers if patient needs an appointment and you are willing to give a one month supply please refill for one month and  send a letter/MyChart using \".SMILLIMITEDREFILL\" .smillimited and route chart to \"P [facility] \" (Giving one month refill in non controlled medications is strongly recommended before denial)    If refill has been denied, meaning absolutely no refills without visit, please complete the smart phrase \".smirxrefuse\" and route it to the \"P [facility] MED REFILLS\"  pool to inform the patient and the pharmacy.    Ratna Li RN        "

## 2023-03-27 RX ORDER — ESTRADIOL 2 MG/1
2 TABLET ORAL DAILY
Qty: 30 TABLET | Refills: 3 | OUTPATIENT
Start: 2023-03-27

## 2023-03-27 RX ORDER — SPIRONOLACTONE 50 MG/1
50 TABLET, FILM COATED ORAL DAILY
Qty: 30 TABLET | Refills: 0 | OUTPATIENT
Start: 2023-03-27

## 2023-04-15 ENCOUNTER — HEALTH MAINTENANCE LETTER (OUTPATIENT)
Age: 33
End: 2023-04-15

## 2024-06-22 ENCOUNTER — HEALTH MAINTENANCE LETTER (OUTPATIENT)
Age: 34
End: 2024-06-22

## 2025-04-30 ENCOUNTER — OFFICE VISIT (OUTPATIENT)
Dept: FAMILY MEDICINE | Facility: CLINIC | Age: 35
End: 2025-04-30

## 2025-04-30 VITALS
HEART RATE: 92 BPM | HEIGHT: 70 IN | RESPIRATION RATE: 17 BRPM | BODY MASS INDEX: 24.39 KG/M2 | TEMPERATURE: 98.4 F | OXYGEN SATURATION: 97 % | SYSTOLIC BLOOD PRESSURE: 148 MMHG | DIASTOLIC BLOOD PRESSURE: 92 MMHG

## 2025-04-30 DIAGNOSIS — F41.0 PANIC ATTACK: ICD-10-CM

## 2025-04-30 DIAGNOSIS — J02.8 PHARYNGITIS DUE TO OTHER ORGANISM: Primary | ICD-10-CM

## 2025-04-30 DIAGNOSIS — R03.0 ELEVATED BLOOD PRESSURE READING WITHOUT DIAGNOSIS OF HYPERTENSION: ICD-10-CM

## 2025-04-30 PROBLEM — J45.909 ASTHMA: Status: ACTIVE | Noted: 2025-04-30

## 2025-04-30 PROBLEM — J45.909 ASTHMA: Status: RESOLVED | Noted: 2025-04-30 | Resolved: 2025-04-30

## 2025-04-30 PROBLEM — K40.90 INGUINAL HERNIA: Status: ACTIVE | Noted: 2020-01-05

## 2025-04-30 LAB
DEPRECATED S PYO AG THROAT QL EIA: NEGATIVE
S PYO DNA THROAT QL NAA+PROBE: NOT DETECTED

## 2025-04-30 RX ORDER — ALPRAZOLAM 0.5 MG
0.5 TABLET ORAL DAILY PRN
Qty: 3 TABLET | Refills: 0 | Status: SHIPPED | OUTPATIENT
Start: 2025-04-30

## 2025-04-30 ASSESSMENT — PAIN SCALES - GENERAL: PAINLEVEL_OUTOF10: NO PAIN (0)

## 2025-04-30 NOTE — PROGRESS NOTES
"  Assessment & Plan     Pharyngitis due to other organism  Will check for strep. May be viral (coxsackievirus). Recommend symptomatic treatment. Has visit scheduled already for 5/7 and can follow-up then for repeat exam.   - Streptococcus A Rapid Screen w/Reflex to PCR - Clinic Collect  - Group A Streptococcus PCR Throat Swab    Panic attack  Needs dental work but keeps cancelling appointment due to having a panic attack about going there. Has used Xanax for this in the past - 3 tablets given. Also recommended locating dentist that has nitrous oxide available to help during procedure.   - ALPRAZolam (XANAX) 0.5 MG tablet  Dispense: 3 tablet; Refill: 0    Elevated blood pressure reading without diagnosis of hypertension  New finding today, will repeat at follow-up visit.               Follow-up  Return in about 1 week (around 5/7/2025), or if symptoms worsen or fail to improve.    Russ Briceno is a 34 year old, presenting for the following health issues:  Throat Pain and Fatigue        4/30/2025     4:07 PM   Additional Questions   Roomed by Leonid   Accompanied by Self         4/30/2025    Information    services provided? No     HPI      1) needs dental work - wondering about a medication to help with anxiety due to dental appt.   Has been prescribed Xanax for panic attacks       2) Sore throat:   Present for approx 2 days (maybe longer)  Nodes feel swollen  Maybe a cough (but not atypical for them to occasionally cough)  No fever  No known sick contacts.                 Objective    BP (!) 148/92   Pulse 92   Temp 98.4  F (36.9  C) (Oral)   Resp 17   Ht 1.778 m (5' 10\")   SpO2 97%   BMI 24.39 kg/m    Body mass index is 24.39 kg/m .  Physical Exam  Constitutional:       General: She is not in acute distress.  HENT:      Head: Normocephalic and atraumatic.      Nose: Nose normal.      Mouth/Throat:      Mouth: Mucous membranes are moist.      Comments: White ulceration present on L " tonsil. Bilateral tonsillary hypertrophy. Uvula midline.   Eyes:      Conjunctiva/sclera: Conjunctivae normal.   Pulmonary:      Effort: Pulmonary effort is normal.   Musculoskeletal:      Cervical back: Neck supple.   Lymphadenopathy:      Cervical: Cervical adenopathy present.   Skin:     General: Skin is warm and dry.   Neurological:      Mental Status: She is alert and oriented to person, place, and time.   Psychiatric:         Judgment: Judgment normal.                Signed Electronically by: Moira Bond DO

## 2025-05-01 NOTE — PATIENT INSTRUCTIONS
Patient Education   Thank you for visiting [facility] today! Here's what we talked about:   Kaity was seen today for throat pain and fatigue.    Diagnoses and all orders for this visit:    Pharyngitis due to other organism  -     Streptococcus A Rapid Screen w/Reflex to PCR - Clinic Collect  -     Group A Streptococcus PCR Throat Swab    Panic attack  -     ALPRAZolam (XANAX) 0.5 MG tablet; Take 1 tablet (0.5 mg) by mouth daily as needed for anxiety.    Elevated blood pressure reading without diagnosis of hypertension                Important Information:    If your symptoms don't go away, please call us or come back to the clinic.  For urgent medical questions, call [phone] anytime.    Referrals and Tests:    We have lab tests, ultrasounds, x-rays, and mammograms at our clinic. To make an appointment, call [phone] or talk to our .  For other referrals and tests, call [phone].    Test Results:    If your results need quick action, we will call you at [phone] (home) . If this number is wrong, please call us to update it.  Most results come back within a few days. If you don't hear about your test results within 2 weeks, call us at [phone]    Moira Bond,

## 2025-05-07 ENCOUNTER — OFFICE VISIT (OUTPATIENT)
Dept: FAMILY MEDICINE | Facility: CLINIC | Age: 35
End: 2025-05-07

## 2025-05-07 VITALS
RESPIRATION RATE: 16 BRPM | DIASTOLIC BLOOD PRESSURE: 82 MMHG | WEIGHT: 212.3 LBS | BODY MASS INDEX: 30.39 KG/M2 | HEIGHT: 70 IN | OXYGEN SATURATION: 97 % | TEMPERATURE: 98.2 F | HEART RATE: 81 BPM | SYSTOLIC BLOOD PRESSURE: 133 MMHG

## 2025-05-07 DIAGNOSIS — Z13.1 SCREENING FOR DIABETES MELLITUS: ICD-10-CM

## 2025-05-07 DIAGNOSIS — Z11.4 SCREENING FOR HIV (HUMAN IMMUNODEFICIENCY VIRUS): ICD-10-CM

## 2025-05-07 DIAGNOSIS — Z11.59 NEED FOR HEPATITIS C SCREENING TEST: ICD-10-CM

## 2025-05-07 DIAGNOSIS — F64.9 GENDER DYSPHORIA: Primary | ICD-10-CM

## 2025-05-07 LAB
ANION GAP SERPL CALCULATED.3IONS-SCNC: 10 MMOL/L (ref 7–15)
BUN SERPL-MCNC: 12.2 MG/DL (ref 6–20)
CALCIUM SERPL-MCNC: 9.4 MG/DL (ref 8.8–10.4)
CHLORIDE SERPL-SCNC: 105 MMOL/L (ref 98–107)
CREAT SERPL-MCNC: 0.92 MG/DL (ref 0.51–1.17)
EGFRCR SERPLBLD CKD-EPI 2021: >90 ML/MIN/1.73M2
ESTRADIOL SERPL-MCNC: 26 PG/ML
GLUCOSE SERPL-MCNC: 92 MG/DL (ref 70–99)
HCO3 SERPL-SCNC: 26 MMOL/L (ref 22–29)
HCV AB SERPL QL IA: NONREACTIVE
HIV 1+2 AB+HIV1 P24 AG SERPL QL IA: NONREACTIVE
POTASSIUM SERPL-SCNC: 4.8 MMOL/L (ref 3.4–5.3)
SODIUM SERPL-SCNC: 141 MMOL/L (ref 135–145)

## 2025-05-07 PROCEDURE — 80048 BASIC METABOLIC PNL TOTAL CA: CPT

## 2025-05-07 PROCEDURE — 87389 HIV-1 AG W/HIV-1&-2 AB AG IA: CPT

## 2025-05-07 PROCEDURE — 82670 ASSAY OF TOTAL ESTRADIOL: CPT | Mod: KX

## 2025-05-07 PROCEDURE — 86803 HEPATITIS C AB TEST: CPT

## 2025-05-07 PROCEDURE — 84403 ASSAY OF TOTAL TESTOSTERONE: CPT | Mod: KX

## 2025-05-07 PROCEDURE — 36415 COLL VENOUS BLD VENIPUNCTURE: CPT

## 2025-05-07 PROCEDURE — 99214 OFFICE O/P EST MOD 30 MIN: CPT | Mod: GC

## 2025-05-07 RX ORDER — ESTRADIOL 2 MG/1
2 TABLET ORAL DAILY
Qty: 30 TABLET | Refills: 3 | Status: SHIPPED | OUTPATIENT
Start: 2025-05-07

## 2025-05-07 RX ORDER — SPIRONOLACTONE 50 MG/1
50 TABLET, FILM COATED ORAL DAILY
Qty: 30 TABLET | Refills: 0 | Status: SHIPPED | OUTPATIENT
Start: 2025-05-07

## 2025-05-07 ASSESSMENT — ANXIETY QUESTIONNAIRES
GAD7 TOTAL SCORE: 5
GAD7 TOTAL SCORE: 5
5. BEING SO RESTLESS THAT IT IS HARD TO SIT STILL: NOT AT ALL
2. NOT BEING ABLE TO STOP OR CONTROL WORRYING: SEVERAL DAYS
7. FEELING AFRAID AS IF SOMETHING AWFUL MIGHT HAPPEN: SEVERAL DAYS
1. FEELING NERVOUS, ANXIOUS, OR ON EDGE: SEVERAL DAYS
3. WORRYING TOO MUCH ABOUT DIFFERENT THINGS: SEVERAL DAYS
IF YOU CHECKED OFF ANY PROBLEMS ON THIS QUESTIONNAIRE, HOW DIFFICULT HAVE THESE PROBLEMS MADE IT FOR YOU TO DO YOUR WORK, TAKE CARE OF THINGS AT HOME, OR GET ALONG WITH OTHER PEOPLE: SOMEWHAT DIFFICULT
6. BECOMING EASILY ANNOYED OR IRRITABLE: NOT AT ALL

## 2025-05-07 ASSESSMENT — ASTHMA QUESTIONNAIRES

## 2025-05-07 ASSESSMENT — PATIENT HEALTH QUESTIONNAIRE - PHQ9
5. POOR APPETITE OR OVEREATING: SEVERAL DAYS
SUM OF ALL RESPONSES TO PHQ QUESTIONS 1-9: 9

## 2025-05-07 ASSESSMENT — PAIN SCALES - GENERAL: PAINLEVEL_OUTOF10: NO PAIN (0)

## 2025-05-07 NOTE — PROGRESS NOTES
Assessment & Plan     Kaity is a 34 year old individual that uses She/Her/Hers/Herself pronouns presents today for interest in feminizing treatment to better align their body with their gender identity. This patient came to this clinic via self referral     Diagnoses and all orders for this visit:  Gender dysphoria  Screening for diabetes mellitus  Screening for HIV (human immunodeficiency virus)  Need for hepatitis C screening test    - Basic metabolic panel; Future  - HIV Screening; Future  - Hepatitis C Screen Reflex to HCV RNA Quant and Genotype; Future  - Estradiol; Future  - Testosterone total; Future  - estradiol (ESTRACE) 2 MG tablet; Take 1 tablet (2 mg) by mouth daily.  Dispense: 30 tablet; Refill: 3  - spironolactone (ALDACTONE) 50 MG tablet; Take 1 tablet (50 mg) by mouth daily.  Dispense: 30 tablet; Refill: 0    Educated about 3 parts of evaluation before starting HRT  Physical health  Mental health  Informed consent    Medical safety for hormones  Lacks contraindications to hormonal therapy  Medication plan: feminizing hormone therapy with estrogen  and spironolactone  Administration route:  oral    Next labs and exam    Recommended laboratory follow up:  Initiation: follow up in 1 month or 3 months  Dose change: follow up in 1 month  Follow up w/ Mayra Arrington  in 3 mo. Note sent to PCP.     Counseling completed today  Counselled patient about controlled substances, never share: Yes  Contraception: abstinence  Educated about testosterone as absolute contraindication in pregnancy: N/A  Fertility plan if starts cross-sex hormones: N/A  Plan nurse visit for shot teaching once medication is in hand     Mental health assessment  Completed by Meryl Salas MD today  Declined referral to mental health provider at this time   Diagnoses are stable and/or are likely to become stabilized by treatment of gender dysphoria    Informed consent process  Yes --- Is able to provide informed consent   Yes --- Likely  to take hormones in a responsible manner  Yes --- Discussed physical effects, benefits, and risk assessment & modification  Yes --- Discussed the clinical and biochemical monitoring of hormonal changes and the potential impact on reproductive health & fertility      We discussed thoroughly the risks/benefits/alternatives of this treatment. Questions elicited and answered in reviewing the informed consent document.  Pt is fully prepared to start hormones and is able to reason through risks and management. Questions elicited and answered and patient verbally consents to hormone treatment.  (This assessment is based on the 2011 published Standards of Care for the Health of Transsexual, Transgender, and Gender-Nonconforming People, Version 7, by the [employer] of Transgender Health. WPATH SOC Guidelines)    Meryl Salas MD        Follow-up   Return in about 3 months (around 8/7/2025).        Russ Briceno is a 34 year old, presenting for the following health issues:  HRT (Follow up - lennie- HRT - anxiety meds - )        5/7/2025     2:40 PM   Additional Questions   Roomed by Shara   Accompanied by self         5/7/2025    Information    services provided? No     HPI        Interested in restarting GAHT  Last had any hormone therapy in 06/2024    [facility]  New Patient History and Physical, Gender-Nonconforming Patient      Name: Kaity  Pronouns: She/Her/Hers/Herself    Patient has primary care outside of [facility]? No      Gender Identity     How would you describe your gender identity? Trans woman      Gender History     What sex were you assigned at birth? male  When did you first recognize that your body and identity didn t match?  11  What parts of your body or presentation make you feel uncomfortable or cause dysphoria?  Body hair  Have you ever seen a healthcare provider for gender-affirming care?   Yes: last seen in 2021  Previous  HRT: YES:   Type of Hormone: estradiol 2 mg daily + spironolactone 50 mg daily  Previous surgeries (where, surgeon name, year, and surgical intervention): None  Ever had problems with hormone treatment? No        Goals of Treatment   Embodiment goals in gender identity alignment  I would like these parts of my body to stay the same: n/a  I am interested in changing these parts of my body or presentation: body hair, acne  Embodiment goals can also include how your body moves through space, or what spaces you have access to (sports teams, bathrooms, etc). Other goals you have? Voice     Desire to have any gender affirming surgery now or in the future?  Yes: changing face shape, possibly bottom surgery  Other types of supports you are using or want to start using:   Haircuts/style and gender affirmative clothing   Body or facial hair removal/enhancement  Breast prostheses/forms or bra wearing   Chest binding  Packing or tucking  Voice therapy      Social Supports      Who supports you for you?   Friends  Are you out at work, school or home?   Yes: got laid off 2 weeks ago      Social History     Living environment  Who lives in your household?   2 other roommates   How do you spend your time? (Work, social activities, extracurricular activities, school, sports, volunteering, chelita based activities, community involvement)  Spending time with friends   How well is your gender identity accepted and supported in each of these environments?  yes  Has anyone hurt you physically, for example by pushing, hitting, slapping or kicking you or forcing you to have sex?   Denies  Do you feel threatened or controlled by a partner, ex-partner or anyone in your life?   Denies    Relationships  How do you describe your sexual or romantic orientation?   Bisexual  Romantic or sexual partners include:    None  Current partners number:   0  Fertility plans? No Interest in cryopreservation? No   Contraception? abstinence      Social History      Socioeconomic History    Marital status: Single     Spouse name: None    Number of children: None    Years of education: None    Highest education level: None   Occupational History    Occupation: student     Employer: [employer]   Tobacco Use    Smoking status: Never     Passive exposure: Never    Smokeless tobacco: Never   Vaping Use    Vaping status: Never Used   Substance and Sexual Activity    Alcohol use: No    Sexual activity: Never     Social Drivers of Health     Interpersonal Safety: Low Risk  (4/30/2025)    Interpersonal Safety     Do you feel physically and emotionally safe where you currently live?: Yes     Within the past 12 months, have you been hit, slapped, kicked or otherwise physically hurt by someone?: No     Within the past 12 months, have you been humiliated or emotionally abused in other ways by your partner or ex-partner?: No         Sexual Health   {Help Text: If you did SOGI, you may skip some of these questions}    Are there sexual health or intimacy concerns with the initiation of hormone therapy?  No  STI History:   Neg  Do you want STI screening today? No      Mental Health Assessment       Have you ever felt depressed because your gender identity and body don t match? Yes  Chemical use history: No  Mental Health diagnosis history  Panic disorder  Mental health hospitalizations: No  History of suicide attempts? No   Current suicidal thoughts? No   Self harm  Past: No   Current: No  Non gender related Therapist? No  Gender therapist? No    - Anxiety   - panic attacks without trigger      2/17/2021     6:51 AM 5/7/2025     3:11 PM   PHQ-9 SCORE   PHQ-9 Total Score MyChart 4 (Minimal depression)    PHQ-9 Total Score 4 9         2/17/2021     6:51 AM 5/7/2025     3:11 PM   DEANDRE-7 SCORE   Total Score 8 (mild anxiety)    Total Score 8 5       Medications prescribed for above and by whom? Xanax 0.5mg - Moira Bond DO          2/17/2021     6:51 AM 5/7/2025     3:11 PM   PHQ   PHQ-9 Total Score  "4 9   Q9: Thoughts of better off dead/self-harm past 2 weeks Not at all Not at all             2/17/2021     6:51 AM 5/7/2025     3:11 PM   DEANDRE-7 SCORE   Total Score 8 (mild anxiety)    Total Score 8 5              Objective    /82   Pulse 81   Temp 98.2  F (36.8  C) (Oral)   Resp 16   Ht 1.778 m (5' 10\")   Wt 96.3 kg (212 lb 4.8 oz)   SpO2 97%   BMI 30.46 kg/m    Body mass index is 30.46 kg/m .  Physical Exam     Vitals reviewed.   Constitutional: awake, alert, cooperative, in NAD  Respiratory: Lungs CTAB without increased work of breathing  Cardiovascular: RRR without murmurs or extra sounds  Skin: No visible lesions, erythema, rashes, or ecchymosis  Musculoskeletal: No extremity redness, swelling, or bony tenderness  Neurologic: A&Ox4 without focal deficit, cranial nerves II-XII grossly intact  Psychiatric: Alert & calm with appropriate affect, mood, and thought processes    Diagnostic Test Results:  Results for orders placed or performed in visit on 05/07/25   HIV Screening     Status: Normal   Result Value Ref Range    HIV Antigen Antibody Combo Nonreactive Nonreactive   Hepatitis C Screen Reflex to HCV RNA Quant and Genotype     Status: Normal   Result Value Ref Range    Hepatitis C Antibody Nonreactive Nonreactive   Estradiol     Status: None   Result Value Ref Range    Estradiol 26 pg/mL   Basic metabolic panel     Status: Normal   Result Value Ref Range    Sodium 141 135 - 145 mmol/L    Potassium 4.8 3.4 - 5.3 mmol/L    Chloride 105 98 - 107 mmol/L    Carbon Dioxide (CO2) 26 22 - 29 mmol/L    Anion Gap 10 7 - 15 mmol/L    Urea Nitrogen 12.2 6.0 - 20.0 mg/dL    Creatinine 0.92 0.51 - 1.17 mg/dL    GFR Estimate >90 >60 mL/min/1.73m2    Calcium 9.4 8.8 - 10.4 mg/dL    Glucose 92 70 - 99 mg/dL    Narrative    The generation of reference intervals for this test is currently based on binary male or female sex. If the electronic health record information indicates another gender identity or if Legal " "Sex is recorded as \"Unknown\", both male and female reference intervals are provided where applicable, and should be considered according to the individual's appropriate clinical context.   Testosterone total     Status: Normal   Result Value Ref Range    Testosterone Total 151 8 - 950 ng/dL    Narrative    The generation of reference intervals for this test is currently based on binary male or female sex. If the electronic health record information indicates another gender identity or if Legal Sex is recorded as \"Unknown\", both male and female reference intervals are provided where applicable, and should be considered according to the individual's appropriate clinical context.           Signed Electronically by: Meryl Salas MD  {Email feedback regarding this note to [email or web address]   :[id number]}  "

## 2025-05-07 NOTE — PATIENT INSTRUCTIONS
Patient Education   Here is the plan from today's visit    1. Screening for diabetes mellitus  - Basic metabolic panel; Future    2. Screening for HIV (human immunodeficiency virus)  - HIV Screening; Future    3. Need for hepatitis C screening test  - Hepatitis C Screen Reflex to HCV RNA Quant and Genotype; Future    4. Gender dysphoria (Primary)  Restarting estradiol and spironolactone therapy. Base line labs today.  - Estradiol; Future  - Basic metabolic panel; Future  - Testosterone total; Future  - estradiol (ESTRACE) 2 MG tablet; Take 1 tablet (2 mg) by mouth daily.  Dispense: 30 tablet; Refill: 3  - spironolactone (ALDACTONE) 50 MG tablet; Take 1 tablet (50 mg) by mouth daily.  Dispense: 30 tablet; Refill: 0      neither   Some online resources for transgender health    [place] [facility]gender [facility]   Home to [facility] at [address] [place] [address], [phone]. Also has support groups.  Http://www.[place]transhealth.org/   Wednesdays: Support Group 6-7:30pm for all gender variant people    [facility] Transgender Health  Increasing access to comprehensive, effective, and affirming healthcare services for trans and gender-variant communities.  http://www.transhealth.[facility].edu/trans?page=lizzie-00-05    [facility]   Community-based non-profit committed to advancing the health & wellness of LGBTQ communities through research, education and advocacy. http://www.[employer].org    Safe, gender-neutral public restrooms in the [facility]   http://www.mntransAptible.org//index.php?option=com_content&task=view&id=12&Itemid    Trans Youth Support Network and The Exchange  For people 26 and under who identify as a trans or gender non-conforming person and want to be a part of an activist organization. Offers peer support, education and community building opportunities. Find them on Facebook!    Reclaim  RECLAIM offers mental and integrative health services for LGBTQ youth and  "their families.  http://www.reclaim-lgbtyouth.org/    Gender Spectrum, for Trans Youth  Gender Spectrum provides education, training and support to help create a gender sensitive and inclusive environment for all children and teens. http://www.genderspectrum.org/    Everton david FTM Resource Guide  Information on topics of interest to female-to-male (FTM, F2M) trans men, and their friends and loved ones.  http://ftuide.org/    Also check out your local [facility]er resource center!  LGBTQIA Services at [facility]: http://www.[facility]/lgbtqia/  LGBTQ@[facility] at [facility]: http://www.[facility]/multiculturalNovavax AB/lgbtq/  GLBTA Programs office at [facility]: https://diversity.[facility]/glbta/    Thoughts of self harm?   Trans Lifeline can be reached at [phone]. This service is staffed by trans people 24/7.   For LGBT youth (ages 24 and younger) contemplating suicide, the [employer] Lifeline can be reached at 9-729-6097.    [place] Gender Transition Law: Know your Rights. Last updated   From: National  Guild [place] Chapter  Http://nlg[place]ota.ClickFox.Anytime DD    Changing your Name  Anyone who is over the age of 18 and has lived in [place] for at least 6 months can file an application for a name change in [place]. The \"Application for a Name Change\" must be filed in the [facility] where the applicant currently lives. The applicant must either pay the filing fee (which is currently $322 in [facility] and $320 in [facility]) or get a fee waiver based on low income. Information about how to obtain a fee waiver is available on the [facility] court website at: www.m[place]ourts.gov/.    After the Application and related forms have been filled out, brought to the  to be filed, and the file fee has been paid, the  will give you a hearing date and time for when you must appear before a . Alternatively, the  may tell you how to schedule your hearing. Many " "courts have help centers and /or information on their websites to help people prepare to represent themselves at their hearing.     Under [place] law, a name change of a minor requires that both parents have notice that an Application for a Name Change for their minor child will be filed. The applicant must show proof that the non-applicant parent(s) have been served with a Notice of Hearing. If the non-applicant parent cannot be served, then the applicant must publish the Notice of Hearing in the legal newspaper in the county were the non-applicant parent was last known to have lived. If the applicant parent does not know who the other parent is, then they must bring a certified copy of the child's birth certificate to the court hearing to show that the non-applicant parent's name is not on the birth certificate.    An applicant for a name change must bring two witnesses to the court hearing to testify about their identity. If the Application is made on behalf of a minor, the minor must be present at the court hearing.     Before filing an Application for a Name Change, an applicant should keep two considerations in mind. First, it is illegal to change your name to avoid legal obligations like paying debts, being sued, or being arrested or charged with a crime. Second, any information in an individual's name change file may be accessed by the public unless the individual's name is being changed because they are in a victim or witness protection program.     If the  approves the Application, they will sign a document called an \"order Granting Name Change.\" This Order will be entered into the court's record. Changing important documents like 's licenses, Social Security cards, and bank accounts requires certified copies of the Order, which can be obtained from the  for a fee. One certified copy of the Order should also be kept with the individual to notify certain public agencies, " "officials or other third parties about their name change.     It is also common for individuals to begin using a new name without formally changing their name. This is legal as long as the individual is not using the new name to commit fraud or other illegal activity.    Changing your Birth Certificate  If you intend to change both your name and your gender marker on your birth certificate, you may file an Application for a Name Change with the court. Changes to your birth certificate will only be made if you can prove that there are exceptional circumstances that make changing the birth certificate necessary. You must state your reasons on your Application form and specifically ask the  to order the Office of Vital Records ([place] Dept of Health) to change the birth certificate, indicating which specific items on the birth certificate are to be changed, and then submit a certified copy of the court order to the Office of Vital Records with the change fee, which is currently $40. To determine if you can use your court order to amend a birth record, review the Requirements Checklist for Amending a Birth Record With a Court Order, available at www.health.[facility].mn.. An 's advice may be useful when pursuing this option.     Changing your 's Licence/State Identification  To change your name on your 's license or ID, bring the court order demonstrating your name change to a  and Vehicle Services location. Be sure to bring your current license/ID as well. Updated licenses are issued for $13.50 .    To change the gender marker on your license or ID requires a \"variance\" issued by the central office of [place]  and Vehicle services in [place]. You must complete a \"Petition for [facility] [place] [facility] Rules 7410 Identify/Residency\" and file it with the central office, being sure to include photocopies of your supporting documentation (such as a new or amended birth certificate and/or a " "letter from a physician or surgeon confirming some form of permanent physical transition) and a check or money order for $10.    Changing your Name/Gender Marker with the Social Security Administration  Changing your name and gender marker with the Social Security Administration, which will result in both a change to your [facility] file and, at your request, a new card being issued, can be accomplished by submitting documentation of both your \"old\" and \"new\" legal identities, suca as a copy of the court order used to changed your name or birth certificate. The [facility] also requires a physician's letter indicating that some permanent physical transition has occurred.     Changing your Passport  A copy of the court order confirming your name change is required to change the name on your passport. To change the gender marker, the State Department requires a detailed statement from a surgeon or hospital indicating that you have, or plan on having corrective surgery. Note however, the the State Department will simply \"stamp over\" the previous gender marker on your passport if you request an amendment, indicating the date on which the gender change took place. The State Department may require additional documents to process your request.     GOVERNMENT RESOURCE LIST    [facility] [place] Courts  [address] [place] [address]  [phone]  Www.mncoSoStupid.coms.gov    [facility] Courts  [address] [place] [address]  [phone]  www.mncourts.gov    [place] Dept of Health and Office of Vital Records  [address] [address]  [place] [address]  [phone]  www.health.state.mn.[facility] Dept of Public Safety  and Vehicle Services  [address] [place] [address]  [place] [address]  [phone]  www.dmv.org/[place]/    [place] Social Security Offices  [phone]    [place] Office  [address] [place] [address]    [facility] Office  [address]  [place] [address]       nor    Informed " Consent Form for Feminizing Medications   This form refers to the use of estrogen and/or androgen antagonists (sometimes called  anti-androgens  or  androgen blockers ) by persons in the male-to-female spectrum who wish to become feminized to reduce gender dysphoria and facilitate a more feminine gender presentation. While there are risks associated with taking feminizing medications, when appropriately prescribed they can greatly improve mental health and quality of life.   Please seek another opinion if you want additional perspective on any aspect of your care.     Feminizing Effects   1. I understand that estrogen, androgen antagonists, or a combination of the two may be   prescribed to reduce male physical features and feminize my body.     2. I understand that the feminizing effects of estrogen and androgen antagonists can take several  months to a year to become noticeable, speed and degree of change is unpredictable.     3.  I understand that if I am taking estrogen I will probably develop breasts, and:    Breasts may take several years to develop to their full size.    Even if estrogen is stopped, the breast tissue that has developed will remain.    As soon as breasts start growing, it is recommended to have an annual breast exam.   There may be milky nipple discharge (galactorrhea). If you develop this, it is advised to check with a doctor to determine the cause.    It is not known if taking estrogen increases the risk of breast cancer.     4. I understand that the following changes are generally not permanent (that is, they will likely reverse if I stop taking feminizing medications):    Skin may become softer.    Muscle mass decreases and there may be a decrease in upper body strength.    Body hair growth may become less noticeable and grow more slowly,but won't stop.   Male pattern baldness may slow down, but will probably not stop completely, and hair that has already been lost will likely not grow  back.    Fat may redistribute to a more feminine pattern (decreased in abdomen, increased on buttocks/hips/thighs - changing from  apple shape  to  pear shape ).     5. I understand that taking feminizing medications will make my testicles produce less testosterone, which can affect my overall sexual function:           Fertility may be impaired, and I should consider sperm banking if I desire biological  children.   Sperm may not mature, leading to reduced fertility. It is still possible to get someone pregnant however and contraception should be used if needed.   Testicles may shrink by 25-50%. Testicular examinations are still recommended.    The amount of fluid ejaculated may be reduced.    There is typically decrease in morning and spontaneous erections.    Erections may not be firm enough for penetrative sex.    Libido (sex drive) may decrease.     6. I understand that there are some aspects of my body that are not significantly changed by feminizing medications, though there may be other treatments that can be helpful.   Garcia/facial hair may grow more slowly and be less noticeable, but will not go away.    Voice pitch will not rise and speech patterns will not become more feminine.    The laryngeal prominence ( Khoi s apple ) will not shrink.      Risks of Feminizing Medications     7. I understand that the medical effects and safety of feminizing medications are not fully understood, and that there may be long-term risks that are not yet known.     8. I understand that I am strongly advised not to take more medication than I am prescribed, as this increases health risks. It won't help changes come faster.     9. I understand that feminizing medications can damage the liver. I have been advised that I should be monitored for possible liver damage as long as I am taking feminizing medications.     10. I understand that feminizing medications will result in changes that will be noticeable by other people, and  that some people in similar circumstances have experienced harassment, discrimination, and violence, while others have lost support of loved ones. I have been advised that referrals can be made for support/counselling if this would be helpful.     Medical Risks Associated with Estrogen     11. I understand that taking estrogen increases the risk of blood clots, which can result in:    pulmonary embolism (blood clot to the lungs), which may cause death    stroke, which may cause permanent brain damage or death    heart attack    chronic leg vein problems   I understand that the risk of blood clots is much worse if I smoke cigarettes, especially over age 40. I understand that the danger is so high that I should stop smoking completely if I start taking estrogen. I can ask my doctor for advice on quitting.    12. I understand that taking estrogen can increase deposits of fat around my internal organs, which is associated with increased risk for diabetes and heart disease.     13. I understand that taking estrogen can cause increased blood pressure,  estrogen increases the risk of gallstones,  estrogen can cause headaches or migraines and can cause nausea and vomiting. I have been advised that if I develop these, it is recommended that I discuss this with my doctor.     14. I understand that it is not known if taking estrogen increases the risk of non-cancerous tumors of the pituitary gland. I have been informed that although this is typically not life-threatening, it can damage vision. I understand that this will be monitored.    15. I have been informed that I am more likely to have dangerous side effects from estrogen if I smoke, am overweight, am over 40 years old, or have a history of blood clots, high blood pressure, or a family history of breast cancer.     16. I have been informed that if I take too much estrogen, my body may convert it into testosterone, which may slow or stop feminization.     Medical Risks  Associated with Androgen Antagonists     17. I have been informed that spironolactone affects the balance of water and salts in the kidneys, and that this may:    increase the amount of urine produced, and I may urinate more frequently    reduce blood pressure    rarely, cause high levels of potassium in the blood, and this will be monitored    18. I understand that some androgen antagonists make it more difficult to evaluate the results of PSA test. If I am over 50, I should have my prostate evaluated every year.     Prevention of Medical Complications     19. I agree to take feminizing medications as prescribed and to tell my care provider if I am not happy with the treatment or am experiencing any problems.     20. I understand that the right dose or type of medication prescribed for me may not be the same as for someone else.     21. I understand that physical examinations and blood tests are needed on a regular basis (monthly, at first) to check for negative side effects of feminizing medications.     22. I understand that feminization medications can interact with other medication (including other sources of hormones), dietary supplements, herbs, alcohol, and street drugs. I understand that being honest with my care provider about what else I am taking will help prevent medical complications that could be life-threatening. I have been informed that I will continue to get medical care no matter what information I share.     23. I understand that some medical conditions make it dangerous to take estrogen or androgen antagonists. I agree to be checked for risky conditions before the decision to start or continue feminizing medication is made.     24. I understand that I can choose to stop taking feminizing medication at any time, and that it is advised that I do this with the help of my doctor to make sure there are no negative reactions to stopping. I understand that my doctor may suggest I reduce, switch or  stop taking feminizing medication, if there are severe health risks that can t be controlled.    My signature below confirms that:    My doctor has talked with me about the benefits and risks of feminizing medication, the possible or likely consequences of hormone therapy, and potential treatment options.    I understand the risks that may be involved.    I understand that this form covers known effects and risks and that there may be long-term effects or risks that are not yet known.    I have had sufficient opportunity to discuss treatment options with my doctor. All of my questions have been answered to my satisfaction.    I believe I have adequate knowledge on which to base informed consent to the provision of feminizing medication.     Based on this:   _____ I wish to begin taking estrogen.     _____ I wish to begin taking androgen antagonists (e.g., Spironolactone).     _____ I do not wish to begin taking feminizing medication at this time.     Whatever your current decision is, please talk with your doctor any time you have questions, concerns, or want to re-evaluate your options.         ____________________________________ __________________   Patient Signature     Date         ____________________________________ __________________   Prescribing Clinician Signature    Date      Please call or return to clinic if your symptoms don't go away.    Follow up plan  Return in about 3 months (around 8/7/2025).    Thank you for coming to [facility] today.  Lab Testing:  **If you had lab testing today and your results are reassuring or normal they will be mailed to you or sent through [employer] within 7 days.   **If the lab tests need quick action we will call you with the results.  **If you are having labs done on a different day, please call [phone] to schedule at [facility] or [phone] for other [facility] locations. Labs do not offer walk-in appointments.  The phone number we  will call with results is # [phone] (home) . If this is not the best number please call our clinic and change the number.  Medication Refills:  If you need any refills please call your pharmacy and they will contact us.   If you need to  your refill at a new pharmacy, please contact the new pharmacy directly. The new pharmacy will help you get your medications transferred faster.   Scheduling:  If you have any concerns about today's visit or wish to schedule another appointment please call our office during normal business hours [phone] (8-5:00 M-F). If you can no longer make a scheduled visit, please cancel via "[employer] or call us to cancel.   If a referral was made to an [facility] specialty provider and you do not get a call from central scheduling, please refer to directions on your visit summary or call our office during normal business hours for assistance.   If a Mammogram was ordered for you at the Breast Center call [phone] to schedule or change your appointment.  If you had an XRay/CT/Ultrasound/MRI ordered the number is [phone] to schedule or change your radiology appointment.   [facility] has limited ultrasound appointments available on Wednesdays, if you would like your ultrasound at [facility], please call [phone] to schedule.   Medical Concerns:  If you have urgent medical concerns please call [phone] at any time of the day.    Meryl Salas MD

## 2025-05-11 LAB — TESTOST SERPL-MCNC: 151 NG/DL (ref 8–950)

## 2025-05-21 ENCOUNTER — RESULTS FOLLOW-UP (OUTPATIENT)
Dept: FAMILY MEDICINE | Facility: CLINIC | Age: 35
End: 2025-05-21

## 2025-07-12 ENCOUNTER — HEALTH MAINTENANCE LETTER (OUTPATIENT)
Age: 35
End: 2025-07-12